# Patient Record
(demographics unavailable — no encounter records)

---

## 2025-04-17 NOTE — CONSULT LETTER
[Dear  ___] : Dear ~FRANKY, [Please see my note below.] : Please see my note below. [Consult Closing:] : Thank you very much for allowing me to participate in the care of this patient.  If you have any questions, please do not hesitate to contact me. [Sincerely,] : Sincerely, [Courtesy Letter:] : I had the pleasure of seeing your patient, [unfilled], in my office today. [FreeTextEntry2] : Dr. Charlie Gloria [FreeTextEntry3] : Kolton Nagy M.D., F.DESIRE.C.S., F.A.S.C.R.S. Chief Colorectal Clinical Services, [facility]

## 2025-04-17 NOTE — PHYSICAL EXAM
[Normal Breath Sounds] : Normal breath sounds [Normal Heart Sounds] : normal heart sounds [Alert] : alert [Oriented to Person] : oriented to person [Oriented to Place] : oriented to place [Oriented to Time] : oriented to time [Calm] : calm [de-identified] : WNL [de-identified] : WNL [de-identified] : LIZZIEL [de-identified] : WNL [de-identified] : WNL [FreeTextEntry1] : Perianal inspection unremarkable.  1 cm firm round tender swelling in the right anterior position on digital exam.  Anoscopy revealed a thrombosed internal hemorrhoid.  Anoscopy performed to evaluate internal hemorrhoids.  No sedation required.

## 2025-04-17 NOTE — PHYSICAL EXAM
[Normal Breath Sounds] : Normal breath sounds [Normal Heart Sounds] : normal heart sounds [Alert] : alert [Oriented to Person] : oriented to person [Oriented to Place] : oriented to place [Oriented to Time] : oriented to time [Calm] : calm [de-identified] : WNL [de-identified] : WNL [de-identified] : LIZZIEL [de-identified] : WNL [de-identified] : WNL [FreeTextEntry1] : Perianal inspection unremarkable.  1 cm firm round tender swelling in the right anterior position on digital exam.  Anoscopy revealed a thrombosed internal hemorrhoid.  Anoscopy performed to evaluate internal hemorrhoids.  No sedation required.

## 2025-04-17 NOTE — HISTORY OF PRESENT ILLNESS
[FreeTextEntry1] : Akin is a 71 y/o female being seen for consultation, hemorrhoids  Colonoscopy 4/1/25 - One 6 mm polyp was found in the cecum, removed.  Diverticulosis in the descending colon and in the sigmoid colon.   Today patient reports having no BMs for four days since last Thursday 4/10/25, was straining and felt swelling tissue, used prep H with relief.  He took Miralax last night and had formed BM, had two BMs today.  Currently the patient has some anorectal soreness and discomfort.  Formed BMs daily, no bleeding.  The swollen tissue has subsided.  No episodes of incontinence of stool or flatus.  Good appetite.  No c/o nausea or vomiting.  Denies fever and chills.  Not on anticoagulants.

## 2025-04-17 NOTE — CONSULT LETTER
[Dear  ___] : Dear ~FRANKY, [Please see my note below.] : Please see my note below. [Consult Closing:] : Thank you very much for allowing me to participate in the care of this patient.  If you have any questions, please do not hesitate to contact me. [Sincerely,] : Sincerely, [Courtesy Letter:] : I had the pleasure of seeing your patient, [unfilled], in my office today. [FreeTextEntry2] : Dr. Charlie Gloria [FreeTextEntry3] : Kolton Nagy M.D., F.DESIRE.C.S., F.A.S.C.R.S. Chief Colorectal Clinical Services, [facility]

## 2025-04-17 NOTE — ASSESSMENT
[FreeTextEntry1] : I have seen and evaluated the patient, and I have corroborated all nursing input into this note.  Patient has a resolving thrombosed internal hemorrhoid.  I prescribed hydrocortisone suppositories.  The patient will return to my office in 3 to 4 weeks if he has any residual symptoms.

## 2025-04-17 NOTE — HISTORY OF PRESENT ILLNESS
[FreeTextEntry1] : Akin is a 73 y/o female being seen for consultation, hemorrhoids  Colonoscopy 4/1/25 - One 6 mm polyp was found in the cecum, removed.  Diverticulosis in the descending colon and in the sigmoid colon.   Today patient reports having no BMs for four days since last Thursday 4/10/25, was straining and felt swelling tissue, used prep H with relief.  He took Miralax last night and had formed BM, had two BMs today.  Currently the patient has some anorectal soreness and discomfort.  Formed BMs daily, no bleeding.  The swollen tissue has subsided.  No episodes of incontinence of stool or flatus.  Good appetite.  No c/o nausea or vomiting.  Denies fever and chills.  Not on anticoagulants.